# Patient Record
Sex: MALE | Race: AMERICAN INDIAN OR ALASKA NATIVE
[De-identification: names, ages, dates, MRNs, and addresses within clinical notes are randomized per-mention and may not be internally consistent; named-entity substitution may affect disease eponyms.]

---

## 2017-10-26 ENCOUNTER — HOSPITAL ENCOUNTER (EMERGENCY)
Dept: HOSPITAL 42 - ED | Age: 27
Discharge: HOME | End: 2017-10-26
Payer: SELF-PAY

## 2017-10-26 VITALS — BODY MASS INDEX: 26.2 KG/M2

## 2017-10-26 VITALS
RESPIRATION RATE: 18 BRPM | OXYGEN SATURATION: 99 % | HEART RATE: 64 BPM | TEMPERATURE: 98.7 F | SYSTOLIC BLOOD PRESSURE: 143 MMHG | DIASTOLIC BLOOD PRESSURE: 77 MMHG

## 2017-10-26 DIAGNOSIS — J03.90: Primary | ICD-10-CM

## 2017-10-26 PROCEDURE — 96372 THER/PROPH/DIAG INJ SC/IM: CPT

## 2017-10-26 PROCEDURE — 99282 EMERGENCY DEPT VISIT SF MDM: CPT

## 2017-10-26 NOTE — ED PDOC
Arrival/HPI





- General


Chief Complaint: ENT Problem


Time Seen by Provider: 10/26/17 00:37


Historian: Patient





- History of Present Illness


Narrative History of Present Illness (Text): 





10/26/17 00:58


26yo male present with sore throat, swollen glands, subjective fever, headache 

x days. He was given Zithromycin for strep throat this morning. Came to 

emergency department for the persistent sore throat. +Odynophagia. Deneis nausea

, vomiting, abdominal pain, dysphagoia, sick contact, any other complaint. 











Past Medical History





- Provider Review


Nursing Documentation Reviewed: Yes





- Psychiatric


Hx Depression: No


Hx Emotional Abuse: No


Hx Physical Abuse: No


Hx Substance Use: No





- Suicidal Assessment


Feels Threatened In Home Enviroment: No





Family/Social History





- Physician Review


Nursing Documentation Reviewed: Yes


Family/Social History: Unknown Family HX


Smoking Status: Never Smoked


Hx Alcohol Use: Yes


Frequency of alcohol use: Socially


Hx Substance Use: No


Hx Substance Use Treatment: No





Allergies/Home Meds


Allergies/Adverse Reactions: 


Allergies





No Known Allergies Allergy (Verified 10/26/17 00:27)


 








Home Medications: 


 Home Meds











 Medication  Instructions  Recorded  Confirmed


 


Unobtainable  10/26/17 10/26/17














Review of Systems





- Physician Review


All systems were reviewed & negative as marked: Yes





- Review of Systems


Constitutional: Normal


Eyes: Normal


ENT: Sore Throat


Respiratory: Normal


Cardiovascular: Normal


Gastrointestinal: Normal


Genitourinary Male: Normal


Musculoskeletal: Normal


Skin: Normal


Neurological: Normal


Endocrine: Normal


Hemo/Lymphatic: Normal


Psychiatric: Normal





Physical Exam


Vital Signs Reviewed: Yes


Vital Signs











  Temp Pulse Resp BP Pulse Ox


 


 10/26/17 00:28  98.7 F  64  18  143/77  99











Temperature: Afebrile


Blood Pressure: Normal


Pulse: Regular


Respiratory Rate: Normal


Appearance: Positive for: Well-Appearing, Non-Toxic, Comfortable


Pain Distress: None


Mental Status: Positive for: Alert and Oriented X 3





- Systems Exam


Head: Present: Atraumatic, Normocephalic


Pupils: Present: PERRL


Extroacular Muscles: Present: EOMI


Conjunctiva: Present: Normal


Mouth: Present: Moist Mucous Membranes


Pharnyx: Present: EXUDATE, TONSILS ENLARGED, Peritonsilar Swelling.  No: 

ERYTHEMA, Uvular Deviation, Muffled/Hoarse Voice, Strider, Soft Palate/Uvular 

Edema


Neck: Present: Normal Range of Motion


Respiratory/Chest: Present: Clear to Auscultation, Good Air Exchange.  No: 

Respiratory Distress, Accessory Muscle Use


Cardiovascular: Present: Regular Rate and Rhythm, Normal S1, S2.  No: Murmurs


Abdomen: Present: Normal Bowel Sounds.  No: Tenderness, Distention, Peritoneal 

Signs


Back: Present: Normal Inspection


Upper Extremity: Present: Normal Inspection.  No: Cyanosis, Edema


Lower Extremity: Present: Normal Inspection.  No: Edema


Neurological: Present: GCS=15, CN II-XII Intact, Speech Normal


Skin: Present: Warm, Dry, Normal Color.  No: Rashes


Psychiatric: Present: Alert, Oriented x 3, Normal Insight, Normal Concentration





Medical Decision Making


ED Course and Treatment: 





10/26/17 01:20


26yo male with sore throat.





He was noted to be controlling his secretion in emergency department. He was 

afebrile and hemodyanmically stable. No uvula deviation was noted. Exudate and 

peritonsillar swelling was noted.





He is already on Zithro.





He will be tx in emergency department with Paz FELICIANO.





DC home and advised to continue with his medication as was directed. Referred 

to his PMD. TRT emergency department or any new or worsening symptoms.











- Medication Orders


Current Medication Orders: 











Discontinued Medications





Dexamethasone (Decadron Inj)  10 mg IM STAT STA


   Stop: 10/26/17 01:02


Ibuprofen (Motrin Tab)  400 mg PO STAT STA


   Stop: 10/26/17 01:03


Penicillin G Benzathine (Bicillin L-A Inj)  1,200,000 units IM STAT STA


   PRN Reason: Protocol


   Stop: 10/26/17 01:02











Disposition/Present on Arrival





- Present on Arrival


Any Indicators Present on Arrival: No


History of DVT/PE: No


History of Uncontrolled Diabetes: No


Urinary Catheter: No


History of Decub. Ulcer: No


History Surgical Site Infection Following: None





- Disposition


Have Diagnosis and Disposition been Completed?: Yes


Diagnosis: 


 Acute tonsillitis





Disposition: HOME/ ROUTINE


Disposition Time: 01:25


Patient Plan: Discharge


Condition: STABLE


Discharge Instructions (ExitCare):  Tonsillitis (ED)


Additional Instructions: 


Follow up with your doctor


Continue with your medication


Return to Ed for any new or worsening symptoms


Referrals: 


[facility] [place] - Follow up with primary


Forms:  [facility] [employer] [facility] (English)

## 2017-12-12 ENCOUNTER — HOSPITAL ENCOUNTER (EMERGENCY)
Dept: HOSPITAL 42 - ED | Age: 27
LOS: 1 days | Discharge: HOME | End: 2017-12-13
Payer: COMMERCIAL

## 2017-12-12 VITALS — BODY MASS INDEX: 29.2 KG/M2

## 2017-12-12 DIAGNOSIS — J03.90: Primary | ICD-10-CM

## 2017-12-12 PROCEDURE — 99283 EMERGENCY DEPT VISIT LOW MDM: CPT

## 2017-12-12 PROCEDURE — 96372 THER/PROPH/DIAG INJ SC/IM: CPT

## 2017-12-13 VITALS
RESPIRATION RATE: 18 BRPM | DIASTOLIC BLOOD PRESSURE: 68 MMHG | HEART RATE: 70 BPM | SYSTOLIC BLOOD PRESSURE: 115 MMHG | TEMPERATURE: 98.8 F | OXYGEN SATURATION: 96 %

## 2017-12-13 NOTE — ED PDOC
Arrival/HPI





- General


Chief Complaint: ENT Problem


Time Seen by Provider: 12/12/17 23:59


Historian: Patient





- History of Present Illness


Narrative History of Present Illness (Text): 


12/13/17 00:20


A 27 year old male presents to the emergency department complaining of sore 

throat discomfort for the past couple days. Patient also reports a low grade 

fever earlier. Denies any difficulty swallowing. Patient denies any nausea, 

vomiting, diarrhea, abdominal pain, shortness of breath or any other complaints 

at this time.





Time/Duration: < week


Symptom Onset: Sudden


Symptom Course: Unchanged


Activities at Onset: Rest


Context: Home





Past Medical History





- Provider Review


Nursing Documentation Reviewed: Yes





- Cardiac


Hx Cardiac Disorders: No





- Pulmonary


Hx Respiratory Disorders: No





- Neurological


Hx Neurological Disorder: No





- HEENT


Hx HEENT Disorder: No





- Renal


Hx Renal Disorder: No





- Endocrine/Metabolic


Hx Endocrine Disorders: No





- Hematological/Oncological


Hx Blood Disorders: No





- Integumentary


Hx Dermatological Disorder: No





- Musculoskeletal/Rheumatological


Hx Musculoskeletal Disorders: No





- Gastrointestinal


Hx Gastrointestinal Disorders: No





- Genitourinary/Gynecological


Hx Genitourinary Disorders: No





- Psychiatric


Hx Depression: No


Hx Emotional Abuse: No


Hx Physical Abuse: No


Hx Substance Use: No





- Anesthesia


Hx Anesthesia: No


Hx Anesthesia Reactions: No


Hx Malignant Hyperthermia: No





- Suicidal Assessment


Feels Threatened In Home Enviroment: No





Family/Social History





- Physician Review


Nursing Documentation Reviewed: Yes


Family/Social History: No Known Family HX


Smoking Status: Never Smoked


Hx Alcohol Use: Yes


Hx Substance Use: No


Hx Substance Use Treatment: No





Allergies/Home Meds


Allergies/Adverse Reactions: 


Allergies





No Known Allergies Allergy (Verified 12/13/17 00:01)


 











Review of Systems





- Physician Review


All systems were reviewed & negative as marked: Yes





- Review of Systems


Constitutional: Fevers (low grade)


ENT: Sore Throat


Respiratory: absent: SOB


Gastrointestinal: absent: Abdominal Pain, Diarrhea, Nausea, Vomiting





Physical Exam


Vital Signs Reviewed: Yes


Vital Signs











  Temp Pulse Resp BP Pulse Ox


 


 12/13/17 00:02  98.8 F  70  18  115/68  96


 


 12/13/17 00:01  98.8 F  70  18  115/68  96











Temperature: Afebrile


Blood Pressure: Normal


Pulse: Regular


Respiratory Rate: Normal


Appearance: Positive for: Well-Appearing, Non-Toxic, Comfortable


Pain Distress: None


Mental Status: Positive for: Alert and Oriented X 3





- Systems Exam


Head: Present: Atraumatic, Normocephalic


Pupils: Present: PERRL


Extroacular Muscles: Present: EOMI


Conjunctiva: Present: Normal


Mouth: Present: Moist Mucous Membranes


Pharnyx: Present: ERYTHEMA (tonsils b/l), EXUDATE (b/l tonsil)


Neck: Present: Normal Range of Motion, Other (supple; mild anterior cervical 

adenopathy).  No: Meningeal Signs


Respiratory/Chest: Present: Clear to Auscultation, Good Air Exchange.  No: 

Respiratory Distress, Accessory Muscle Use


Cardiovascular: Present: Regular Rate and Rhythm, Normal S1, S2.  No: Murmurs


Abdomen: Present: Normal Bowel Sounds.  No: Tenderness, Distention, Peritoneal 

Signs


Back: Present: Normal Inspection


Upper Extremity: Present: Normal Inspection.  No: Cyanosis, Edema


Lower Extremity: Present: Normal Inspection.  No: Edema


Neurological: Present: GCS=15, CN II-XII Intact, Speech Normal


Skin: Present: Warm, Dry, Normal Color.  No: Rashes


Psychiatric: Present: Alert, Oriented x 3, Normal Insight, Normal Concentration





Medical Decision Making


ED Course and Treatment: 


12/13/17 00:40


Impression:


A 27 year old male with sore throat discomfort and low grade fever.





Plan:


-- Bicillin, Decadron


-- Reassess and disposition





Prior Visits:


Notes and results from previous visits were reviewed. Patient was last seen in 

the emergency department on 10/26/17 for evaluation of sore throat, swollen 

glands, subjective fever and headache.





Progress Notes:





12/13/17 00:45


On re-evaluation, patient feels better and is in no acute distress. I have 

discussed the results and plan with the patient, who expresses understanding. 

Patient in agreement with plan to be discharged home. Patient is stable for 

discharge. Patient was instructed to follow up with physician or return if 

symptoms worsen or new concerning symptoms arise.








- Medication Orders


Current Medication Orders: 











Discontinued Medications





Dexamethasone (Decadron Inj)  10 mg IM ONCE ONE


   Stop: 12/13/17 00:33


   Last Admin: 12/13/17 00:52  Dose: 10 mg





IM Administration Charges


 Document     12/13/17 00:52  ADÁN  (Rec: 12/13/17 00:52  AD[place]  [facility] [id number]


     Injection Site


      MAR Injection Site                         Right Gluteus Ar


     Charges for Administration


      # of IM Administrations                    1





Penicillin G Benzathine (Bicillin L-A Inj)  1,200,000 units IM STAT STA


   PRN Reason: Protocol


   Stop: 12/13/17 00:32


   Last Admin: 12/13/17 00:51  Dose: 1,200,000 units





IM Administration Charges


 Document     12/13/17 00:51  ADÁN  (Rec: 12/13/17 00:52  AD[place]  [facility] [id number]


     Injection Site


      MAR Injection Site                         Left Gluteus Ar


     Charges for Administration


      # of IM Administrations                    1














- Scribe Statement


The provider has reviewed the documentation as recorded by the Scribe


Jordi Murray





Provider Scribe Attestation:


All medical record entries made by the Scribe were at my direction and 

personally dictated by me. I have reviewed the chart and agree that the record 

accurately reflects my personal performance of the history, physical exam, 

medical decision making, and the department course for this patient. I have 

also personally directed, reviewed, and agree with the discharge instructions 

and disposition.











Disposition/Present on Arrival





- Present on Arrival


Any Indicators Present on Arrival: No


History of DVT/PE: No


History of Uncontrolled Diabetes: No


Urinary Catheter: No


History of Decub. Ulcer: No


History Surgical Site Infection Following: None





- Disposition


Have Diagnosis and Disposition been Completed?: Yes


Diagnosis: 


 Tonsillitis





Disposition: HOME/ ROUTINE


Disposition Time: 00:50


Patient Plan: Discharge


Condition: STABLE


Discharge Instructions (ExitCare):  Tonsillitis (ED)


Additional Instructions: 


Drink cool liquids/take meds as prescribed/follow up with your doctor this week


Prescriptions: 


Amoxicillin [Amoxil 500 mg Cap] 500 mg PO TID #21 cap


Forms:  [employer] (English)

## 2018-08-11 ENCOUNTER — HOSPITAL ENCOUNTER (EMERGENCY)
Dept: HOSPITAL 31 - C.ER | Age: 28
LOS: 1 days | Discharge: HOME | End: 2018-08-12
Payer: COMMERCIAL

## 2018-08-11 VITALS — BODY MASS INDEX: 29.2 KG/M2

## 2018-08-11 VITALS — TEMPERATURE: 98.6 F

## 2018-08-11 DIAGNOSIS — K29.70: ICD-10-CM

## 2018-08-11 DIAGNOSIS — R10.31: Primary | ICD-10-CM

## 2018-08-11 LAB
APTT BLD: 32 SECONDS (ref 21–34)
BASOPHILS # BLD AUTO: 0.1 K/UL (ref 0–0.2)
BASOPHILS NFR BLD: 0.6 % (ref 0–2)
EOSINOPHIL # BLD AUTO: 0 K/UL (ref 0–0.7)
EOSINOPHIL NFR BLD: 0.1 % (ref 0–4)
ERYTHROCYTE [DISTWIDTH] IN BLOOD BY AUTOMATED COUNT: 13.9 % (ref 11.5–14.5)
HGB BLD-MCNC: 13.7 G/DL (ref 12–18)
INR PPP: 1.2
LYMPHOCYTES # BLD AUTO: 1.9 K/UL (ref 1–4.3)
LYMPHOCYTES NFR BLD AUTO: 13.3 % (ref 20–40)
MCH RBC QN AUTO: 27.8 PG (ref 27–31)
MCHC RBC AUTO-ENTMCNC: 33.2 G/DL (ref 33–37)
MCV RBC AUTO: 83.5 FL (ref 80–94)
MONOCYTES # BLD: 1.2 K/UL (ref 0–0.8)
MONOCYTES NFR BLD: 8.4 % (ref 0–10)
NEUTROPHILS # BLD: 11.2 K/UL (ref 1.8–7)
NEUTROPHILS NFR BLD AUTO: 77.6 % (ref 50–75)
NRBC BLD AUTO-RTO: 0 % (ref 0–2)
PLATELET # BLD: 243 K/UL (ref 130–400)
PMV BLD AUTO: 8.1 FL (ref 7.2–11.7)
PROTHROMBIN TIME: 13 SECONDS (ref 9.7–12.2)
RBC # BLD AUTO: 4.95 MIL/UL (ref 4.4–5.9)
WBC # BLD AUTO: 14.5 K/UL (ref 4.8–10.8)

## 2018-08-11 PROCEDURE — 83690 ASSAY OF LIPASE: CPT

## 2018-08-11 PROCEDURE — 85025 COMPLETE CBC W/AUTO DIFF WBC: CPT

## 2018-08-11 PROCEDURE — 80053 COMPREHEN METABOLIC PANEL: CPT

## 2018-08-11 PROCEDURE — 96361 HYDRATE IV INFUSION ADD-ON: CPT

## 2018-08-11 PROCEDURE — 85610 PROTHROMBIN TIME: CPT

## 2018-08-11 PROCEDURE — 99284 EMERGENCY DEPT VISIT MOD MDM: CPT

## 2018-08-11 PROCEDURE — 81001 URINALYSIS AUTO W/SCOPE: CPT

## 2018-08-11 PROCEDURE — 96374 THER/PROPH/DIAG INJ IV PUSH: CPT

## 2018-08-11 PROCEDURE — 74177 CT ABD & PELVIS W/CONTRAST: CPT

## 2018-08-11 PROCEDURE — 85730 THROMBOPLASTIN TIME PARTIAL: CPT

## 2018-08-11 NOTE — C.PDOC
History Of Present Illness


28 year old male presents to the ED c/o diffuse crampy abdominal pain for the 

past 3 months, that he rates at a 4/10. Patient now states that for the past 2-

3 weeks he has been having RLQ abdominal pain. Patient states no change in 

bowel habits. Patient denies fever, chills, nausea, vomit, diarrhea, recent 

travel, sick contacts. 


Time Seen by Provider: 08/11/18 23:39


Chief Complaint (Nursing): Abdominal Pain


History Per: Patient


History/Exam Limitations: no limitations


Onset/Duration Of Symptoms: Days


Current Symptoms Are (Timing): Still Present


Severity: Mild


Location Of Pain/Discomfort: Diffuse, RLQ


Radiation Of Pain To:: None


Quality Of Discomfort: Cramping


Associated Symptoms: denies: Nausea, Vomiting, Diarrhea


Alleviating Factors: None


Recent travel outside of the [place]: No


Additional History Per: Patient





Past Medical History


Reviewed: Historical Data, Nursing Documentation, Vital Signs


Vital Signs: 


 Last Vital Signs











Temp  98.6 F   08/11/18 23:28


 


Pulse  70   08/11/18 23:42


 


Resp  20   08/11/18 23:28


 


BP  117/78   08/11/18 23:28


 


Pulse Ox  98   08/11/18 23:49














- Medical History


PMH: No Chronic Diseases


   Denies: Depression, Chronic Kidney Disease


Surgical History: No Surg Hx





- [facility] Procedures








APPLICATION OF SPLINT (06/05/12)








Family History: States: Unknown Family Hx





- Social History


Hx Tobacco Use: No


Hx Alcohol Use: Yes


Hx Substance Use: No





- Immunization History


Hx Tetanus Toxoid Vaccination: No


Hx Influenza Vaccination: No


Hx Pneumococcal Vaccination: No





Review Of Systems


Constitutional: Negative for: Fever, Chills


Cardiovascular: Negative for: Chest Pain


Respiratory: Negative for: Shortness of Breath


Gastrointestinal: Positive for: Abdominal Pain.  Negative for: Nausea, Vomiting

, Diarrhea, Constipation


Musculoskeletal: Negative for: Back Pain


Skin: Negative for: Rash





Physical Exam





- Physical Exam


Appears: Non-toxic, No Acute Distress


Skin: Warm, Dry


Head: Normacephalic


Eye(s): bilateral: Normal Inspection


Oral Mucosa: Moist


Neck: Supple


Chest: Symmetrical


Cardiovascular: Rhythm Regular


Respiratory: No Rales, No Rhonchi, No Wheezing


Gastrointestinal/Abdominal: Soft, Tenderness (mild, diffuse ), No Guarding, No 

Rebound, Other (tympanic to percussion)


Back: Normal Inspection


Extremity: No Tenderness, No Swelling


Extremity: Bilateral: Atraumatic, Normal Color And Temperature, Normal ROM


Neurological/Psych: Oriented x3, Normal Speech


Gait: Steady





ED Course And Treatment





- Laboratory Results


Result Diagrams: 


 08/11/18 23:46





 08/11/18 23:46


O2 Sat by Pulse Oximetry: 98 (ON RA)


Pulse Ox Interpretation: Normal


Progress Note: Plan:  - CT abd/pelvis.  - Labs.  - Protonix 40 mg IVP.  - IV 

fluids.  - UA


Reevaluation Time: 03:14


Reassessment Condition: Improved





Medical Decision Making


Medical Decision Making: 





Upon provider reevaluation patient is feeling better, is medically stable, and 

requires no further treatment in the ED at this time. Patient will be 

discharged home with Rx for flagyl, protonix . Counseling was provided and all 

questions were answered regarding diagnosis and need for follow up with the 

referred clinic. There is agreement to discharge plan. Return if symptoms 

persist or worsen.





Disposition


Counseled Patient/Family Regarding: Studies Performed, Diagnosis, Need For 

Followup, Rx Given





- Disposition


Referrals: 


[facility] [Outside]


[facility] [[facility]]


Disposition: HOME/ ROUTINE


Disposition Time: 23:39


Condition: FAIR


Additional Instructions: 


Please return if symptoms recur





Prescriptions: 


metroNIDAZOLE [Flagyl] 500 mg PO TID #21 tab


Pantoprazole Sodium [Protonix] 40 mg PO DAILY #15 ect


Instructions:  Acute Abdomen (Belly Pain), Adult (DC), Gastritis (DC)


Forms:  [facility] (English)





- Clinical Impression


Clinical Impression: 


 Abdominal pain, Gastritis








- Scribe Statement


The provider has reviewed the documentation as recorded by the Scribpierre Walker





All medical record entries made by the Scribe were at my direction and 

personally dictated by me. I have reviewed the chart and agree that the record 

accurately reflects my personal performance of the history, physical exam, 

medical decision making, and the department course for this patient. I have 

also personally directed, reviewed, and agree with the discharge instructions 

and disposition.

## 2018-08-12 VITALS
DIASTOLIC BLOOD PRESSURE: 70 MMHG | OXYGEN SATURATION: 98 % | RESPIRATION RATE: 14 BRPM | SYSTOLIC BLOOD PRESSURE: 119 MMHG | HEART RATE: 78 BPM

## 2018-08-12 LAB
ALBUMIN SERPL-MCNC: 4.9 G/DL (ref 3.5–5)
ALBUMIN/GLOB SERPL: 1.4 {RATIO} (ref 1–2.1)
ALT SERPL-CCNC: 26 U/L (ref 21–72)
AST SERPL-CCNC: 39 U/L (ref 17–59)
BILIRUB UR-MCNC: NEGATIVE MG/DL
BUN SERPL-MCNC: 17 MG/DL (ref 9–20)
CALCIUM SERPL-MCNC: 10.2 MG/DL (ref 8.6–10.4)
GFR NON-AFRICAN AMERICAN: 52
GLUCOSE UR STRIP-MCNC: NORMAL MG/DL
LEUKOCYTE ESTERASE UR-ACNC: (no result) LEU/UL
LIPASE: 192 U/L (ref 23–300)
PH UR STRIP: 5 [PH] (ref 5–8)
PROT UR STRIP-MCNC: (no result) MG/DL
RBC # UR STRIP: NEGATIVE /UL
SP GR UR STRIP: > 1.06 (ref 1–1.03)
SQUAMOUS EPITHIAL: 1 /HPF (ref 0–5)
UROBILINOGEN UR-MCNC: 2 MG/DL (ref 0.2–1)

## 2018-08-12 NOTE — CT
Date of service: 



08/12/2018



PROCEDURE:  CT Abdomen and Pelvis with contrast



HISTORY:

rlq abd



COMPARISON:

Comparison is made with 02/04/2016



TECHNIQUE:

Contrast dose: 100 mL Visipaque 320.



Axial and reformatted coronal and sagittal CT images of the abdomen 

and pelvis were obtained after IV contrast administration.



Radiation dose:



Total exam DLP = 995.53 mGy-cm.



This CT exam was performed using one or more of the following dose 

reduction techniques: Automated exposure control, adjustment of the 

mA and/or kV according to patient size, and/or use of iterative 

reconstruction technique.



FINDINGS:



LOWER THORAX:

Unremarkable. 



LIVER:

Mild to moderate hepatomegaly is again noted.  Mild heterogeneous 

coarse enhancement of the liver is noted. No evidence of discrete 

mass lesion. The portal vein is patent. 



GALLBLADDER AND BILE DUCTS:

Unremarkable. 



PANCREAS:

Unremarkable. No gross lesion or ductal dilatation.



SPLEEN:

Unremarkable. 



ADRENALS:

Unremarkable. No mass. 



KIDNEYS AND URETERS:

Unremarkable. No hydronephrosis. No solid mass. 



VASCULATURE:

Unremarkable. No aortic aneurysm. 



BOWEL:

Unremarkable. No obstruction. No gross mural thickening. 



APPENDIX:

Normal appendix. 



PERITONEUM:

Unremarkable. No free fluid. No free air. 



LYMPH NODES:

Unremarkable. No enlarged lymph nodes. 



BLADDER:

Unremarkable. 



REPRODUCTIVE:

Unremarkable. 



BONES:

No acute fracture. 



OTHER FINDINGS:

None.



IMPRESSION:

No evidence of appendicitis. No evidence of cholecystitis or 

pancreatitis.



No evidence of hydronephrosis or bowel obstruction.

## 2019-03-31 ENCOUNTER — HOSPITAL ENCOUNTER (INPATIENT)
Dept: HOSPITAL 42 - ED | Age: 29
LOS: 3 days | Discharge: HOME | DRG: 310 | End: 2019-04-03
Attending: INTERNAL MEDICINE | Admitting: INTERNAL MEDICINE
Payer: COMMERCIAL

## 2019-03-31 VITALS — BODY MASS INDEX: 32.5 KG/M2

## 2019-03-31 DIAGNOSIS — E66.9: ICD-10-CM

## 2019-03-31 DIAGNOSIS — R07.9: ICD-10-CM

## 2019-03-31 DIAGNOSIS — D72.829: ICD-10-CM

## 2019-03-31 DIAGNOSIS — I07.1: ICD-10-CM

## 2019-03-31 DIAGNOSIS — I48.0: Primary | ICD-10-CM

## 2019-03-31 DIAGNOSIS — R06.02: ICD-10-CM

## 2019-03-31 DIAGNOSIS — R00.2: ICD-10-CM

## 2019-03-31 LAB
ALBUMIN SERPL-MCNC: 4.4 G/DL (ref 3–4.8)
ALBUMIN/GLOB SERPL: 1 {RATIO} (ref 1.1–1.8)
ALT SERPL-CCNC: 25 U/L (ref 7–56)
APPEARANCE UR: CLEAR
APTT BLD: 35.7 SECONDS (ref 26.9–38.3)
AST SERPL-CCNC: 40 U/L (ref 17–59)
BASOPHILS # BLD AUTO: 0.03 K/MM3 (ref 0–2)
BASOPHILS NFR BLD: 0.3 % (ref 0–3)
BILIRUB UR-MCNC: NEGATIVE MG/DL
BNP SERPL-MCNC: 359 PG/ML (ref 0–450)
BUN SERPL-MCNC: 13 MG/DL (ref 7–21)
CALCIUM SERPL-MCNC: 10.2 MG/DL (ref 8.4–10.5)
CK MB SERPL-MCNC: 1.5 NG/ML (ref 0–3.6)
COLOR UR: YELLOW
D DIMER PPP FEU-MCNC: 531 NG/MLDDU (ref 0–243)
EOSINOPHIL # BLD: 0.3 10*3/UL (ref 0–0.7)
EOSINOPHIL NFR BLD: 2.8 % (ref 1.5–5)
ERYTHROCYTE [DISTWIDTH] IN BLOOD BY AUTOMATED COUNT: 13.7 % (ref 11.5–14.5)
GFR NON-AFRICAN AMERICAN: > 60
GLUCOSE UR STRIP-MCNC: NEGATIVE MG/DL
HGB BLD-MCNC: 15.6 G/DL (ref 14–18)
INR PPP: 1.02
LEUKOCYTE ESTERASE UR-ACNC: NEGATIVE LEU/UL
LYMPHOCYTES # BLD: 4.3 10*3/UL (ref 1.2–3.4)
LYMPHOCYTES NFR BLD AUTO: 36.4 % (ref 22–35)
MCH RBC QN AUTO: 28.2 PG (ref 25–35)
MCHC RBC AUTO-ENTMCNC: 32.7 G/DL (ref 31–37)
MCV RBC AUTO: 86.1 FL (ref 80–105)
MONOCYTES # BLD AUTO: 0.7 10*3/UL (ref 0.1–0.6)
MONOCYTES NFR BLD: 5.9 % (ref 1–6)
PH UR STRIP: 7 [PH] (ref 4.7–8)
PLATELET # BLD: 275 10^3/UL (ref 120–450)
PMV BLD AUTO: 10.2 FL (ref 7–11)
PROT UR STRIP-MCNC: NEGATIVE MG/DL
PROTHROMBIN TIME: 11.3 SECONDS (ref 9.4–12.5)
RBC # BLD AUTO: 5.54 10^6/UL (ref 3.5–6.1)
RBC # UR STRIP: NEGATIVE /UL
SP GR UR STRIP: 1.01 (ref 1–1.03)
T3 SERPL-MCNC: 1.34 NG/ML (ref 0.97–1.69)
TROPONIN I SERPL-MCNC: < 0.01 NG/ML
UROBILINOGEN UR STRIP-ACNC: 0.2 E.U./DL
WBC # BLD AUTO: 11.8 10^3/UL (ref 4.5–11)

## 2019-03-31 NOTE — RAD
Date of service: 



03/31/2019



HISTORY:

 CP 



COMPARISON:

No prior. 



TECHNIQUE:

1 view obtained.



FINDINGS:



LUNGS:

No active pulmonary disease.



PLEURA:

No significant pleural effusion identified, no pneumothorax apparent.



CARDIOVASCULAR:

No aortic atherosclerotic calcification present.



Normal cardiac size. No pulmonary vascular congestion. 



OSSEOUS STRUCTURES:

No significant abnormalities.



VISUALIZED UPPER ABDOMEN:

Normal.



OTHER FINDINGS:

None.



IMPRESSION:

No active disease.

## 2019-03-31 NOTE — CT
Date of service: 



03/31/2019



PROCEDURE:  CT Chest with contrast (Pulmonary Angiogram)



HISTORY:

cough/chest pain/



COMPARISON:

None available.



TECHNIQUE:

Axial computed tomography images were obtained of the chest in the 

pulmonary arterial phase of enhancement. Coronal and sagittal 

reformatted images were created and reviewed.



Intravenous contrast dose: 150 cc of Omni 350



Radiation dose:



Total exam DLP = 566.09 mGy-cm.



This CT exam was performed using one or more of the following dose 

reduction techniques: Automated exposure control, adjustment of the 

mA and/or kV according to patient size, and/or use of iterative 

reconstruction technique.



FINDINGS:



PULMONARY ARTERIES:

Unremarkable. No pulmonary embolism. 



AORTA:

No acute findings. No thoracic aortic aneurysm. No aortic 

atherosclerotic calcification or mural plaque present.



LUNGS:

Unremarkable. No nodule, mass or pulmonary consolidation. 



PLEURAL SPACES:

Unremarkable. No effusion or pneumothorax. 



HEART:

Unremarkable. No cardiomegaly. No significant pericardial effusion. 



LYMPH NODES:

No lymphadenopathy.



BONES, CHEST WALL:

Unremarkable. No fracture or destructive lesion 



OTHER FINDINGS:

Unremarkable. 



IMPRESSION:

Unremarkable CT pulmonary angiogram. No pulmonary embolus.

## 2019-03-31 NOTE — ED PDOC
Arrival/HPI





- General


Chief Complaint: Chest Pain


Time Seen by Provider: 03/31/19 13:10


Historian: Patient





- History of Present Illness


Narrative History of Present Illness (Text): 





03/31/19 13:30


28 year old male, with no significant past medical history, presents to the 

emergency department complaining of cough for the past 1 week. Patient reports 

he has been having intermittent shortness of breath, chest pain, and palpitatio

ns since yesterday. He states he tried over the counter [place] and 

Theraflu without any improvement. Patient denies any fever, chills, abdominal 

pain, nausea, vomiting, diarrhea, constipation, urinary symptoms, back pain, 

neck pain, headache, dizziness, weakness, or any other complaints. 





Time/Duration: 1 week


Symptom Onset: Gradual


Symptom Course: Unchanged


Activities at Onset: Light


Context: Home





Past Medical History





- Provider Review


Nursing Documentation Reviewed: Yes





- Travel History


Have you recently traveled outside [place] w/in the past 3 mons?: No





- Tetanus Immunization


Tetanus Immunization: Unknown





- Cardiac


Hx Cardiac Disorders: No





- Pulmonary


Hx Respiratory Disorders: No





- Neurological


Hx Neurological Disorder: No





- HEENT


Hx HEENT Disorder: No





- Renal


Hx Renal Disorder: No





- Endocrine/Metabolic


Hx Endocrine Disorders: No





- Hematological/Oncological


Hx Blood Disorders: No





- Integumentary


Hx Dermatological Disorder: No





- Musculoskeletal/Rheumatological


Hx Musculoskeletal Disorders: No





- Gastrointestinal


Hx Gastrointestinal Disorders: No





- Genitourinary/Gynecological


Hx Genitourinary Disorders: No





- Psychiatric


Hx Depression: No


Hx Substance Use: No





- Anesthesia


Hx Anesthesia: No


Hx Anesthesia Reactions: No


Hx Malignant Hyperthermia: No





- Suicidal Assessment


Feels Threatened In Home Enviroment: No





Family/Social History





- Physician Review


Nursing Documentation Reviewed: Yes


Family/Social History: No Known Family HX


Smoking Status: Never Smoked


Hx Alcohol Use: Yes


Frequency of alcohol use: Socially


Hx Substance Use: No


Hx Substance Use Treatment: No





Allergies/Home Meds


Allergies/Adverse Reactions: 


Allergies





No Known Allergies Allergy (Verified 03/31/19 13:16)


   








Home Medications: 


                                    Home Meds











 Medication  Instructions  Recorded  Confirmed


 


No Known Home Med  03/31/19 03/31/19














Review of Systems





- Physician Review


All systems were reviewed & negative as marked: Yes





- Review of Systems


Constitutional: absent: Fatigue, Fevers, Other (chills)


ENT: absent: Sore Throat, Sinus Congestion


Respiratory: SOB, Cough


Cardiovascular: Chest Pain, Palpitations


Gastrointestinal: absent: Abdominal Pain, Constipation, Diarrhea, Nausea, 

Vomiting


Genitourinary Male: absent: Dysuria, Frequency, Hematuria


Musculoskeletal: absent: Back Pain, Neck Pain


Neurological: absent: Headache, Dizziness, Focal Weakness


Psychiatric: absent: Anxiety, Depression





Physical Exam


Vital Signs Reviewed: Yes





Vital Signs











  Temp Pulse Resp BP Pulse Ox


 


 03/31/19 14:01     115/56 L 


 


 03/31/19 14:00   149 H   


 


 03/31/19 13:06  98.2 F  82  18  127/95 H  99











Temperature: Afebrile


Blood Pressure: Normal


Pulse: Regular


Respiratory Rate: Normal


Appearance: Positive for: Well-Appearing, Non-Toxic, Comfortable


Pain Distress: None


Mental Status: Positive for: Alert and Oriented X 3





- Systems Exam


Head: Present: Atraumatic, Normocephalic


Pupils: Present: PERRL


Extroacular Muscles: Present: EOMI


Conjunctiva: Present: Normal


Mouth: Present: Moist Mucous Membranes


Neck: Present: Normal Range of Motion


Respiratory/Chest: Present: Clear to Auscultation, Good Air Exchange.  No: 

Respiratory Distress, Accessory Muscle Use


Cardiovascular: Present: Irregular Rhythm, Tachycardic.  No: Regular Rate and 

Rhythm, Murmurs, Rub, Muffled


Abdomen: No: Tenderness, Distention, Peritoneal Signs, Rebound, Guarding


Back: Present: Normal Inspection


Upper Extremity: Present: Normal ROM


Lower Extremity: Present: Normal ROM.  No: CALF TENDERNESS


Neurological: Present: GCS=15, Speech Normal


Skin: Present: Warm, Dry, Normal Color.  No: Rashes


Psychiatric: Present: Alert, Oriented x 3





Medical Decision Making


ED Course and Treatment: 





03/31/19 13:30


Impression:


28 year old male presents complaining of cough for the past 1 week associated 

with intermittent shortness of breath, chest pain, and palpitations for the past

few days. 





Plan:


-- CT Angio Chest PE protocol


-- EKG 


-- Labs


-- Chest X-ray 


-- Cardizem


-- Rapid Flu


-- Urinalysis 


-- Reassess and disposition





Prior Visits:


Notes and results from previous visits were reviewed. 





Progress Notes:





Patient immediately seen and evaluated by Dr. Beltran. Patient was found to be

in rapid a-fib at range of 140-160 BPM. 10mg of Cardizem administered IV. 

Patient's heart rate now is in the 80's. 





03/31/19 16:59


cbc; wnl


cmp; wnl


trop; wnl





mag; 2.3





bnp; 359


DIMer; elevated at 531








cxr; no infiltrate








ct angio:FINDINGS:


PULMONARY ARTERIES:


Unremarkable. No pulmonary embolism. 


AORTA:


No acute findings. No thoracic aortic aneurysm. No aortic atherosclerotic 

calcification or mural plaque present.


LUNGS:


Unremarkable. No nodule, mass or pulmonary consolidation. 


PLEURAL SPACES:


Unremarkable. No effusion or pneumothorax. 


HEART:


Unremarkable. No cardiomegaly. No significant pericardial effusion. 


LYMPH NODES:


No lymphadenopathy.


BONES, CHEST WALL:


Unremarkable. No fracture or destructive lesion 


OTHER FINDINGS:


Unremarkable. 


IMPRESSION:


Unremarkable CT pulmonary angiogram. No pulmonary embolus.








pt reassessment; resting comfortably in er; HR in the 90s. no distress. 








all results discussed with patient and family in depth; 











case discussed with dr. aragon. accepts admission with cardiologist consult. Dr. aragon wants ICU consult.  





pt reassessment; pt continues to have paroxysmal atrial fibrillation. 





case discussed with dr. Montes (intensivist): pt seen and evaluated at 

bedside; pt accepted to [facility]. pt will go to ICU on Cardizem drip








Case discussed with Dr. Mitchell in depth: He advised lovenox sq. 





first dose of lovenox ordered.





All aspects of this case were discussed the attending of record.





impression; rapid afib, new onset


admit ICU








Reassessment Condition: Re-examined, Improving,but remains with symptoms





- Lab Interpretations


Lab Results: 





                                        











PT  11.3 SECONDS (9.4-12.5)   03/31/19  13:45    


 


INR  1.02   03/31/19  13:45    


 


APTT  35.7 Seconds (26.9-38.3)   03/31/19  13:45    








                                        











D-Dimer, Quantitative  531 ng/mlDDU (0-243)  H  03/31/19  13:45    








                                        











Troponin I  < 0.01 ng/mL  03/31/19  13:45    








                                        











NT-Pro-B Natriuret Pep  359 pg/mL (0-450)   03/31/19  13:45    








                                        











Total Bilirubin  0.5 mg/dL (0.2-1.3)   03/31/19  13:45    


 


AST  40 U/L (17-59)   03/31/19  13:45    


 


ALT  25 U/L (7-56)   03/31/19  13:45    


 


Alkaline Phosphatase  82 U/L ()   03/31/19  13:45    


 


Total Protein  8.8 g/dL (5.8-8.3)  H  03/31/19  13:45    


 


Albumin  4.4 g/dL (3.0-4.8)   03/31/19  13:45    


 


Globulin  4.3 gm/dL  03/31/19  13:45    


 


Albumin/Globulin Ratio  1.0  (1.1-1.8)  L  03/31/19  13:45    








                                        











Urine Color  Yellow  (YELLOW)   03/31/19  14:50    


 


Urine Appearance  Clear  (CLEAR)   03/31/19  14:50    


 


Urine pH  7.0  (4.7-8.0)   03/31/19  14:50    


 


Ur Specific Gravity  1.015  (1.005-1.035)   03/31/19  14:50    


 


Urine Protein  Negative mg/dL (<30 mg/dL)   03/31/19  14:50    


 


Urine Glucose (UA)  Negative mg/dL (NEGATIVE)   03/31/19  14:50    


 


Urine Ketones  Negative mg/dL (NEGATIVE)   03/31/19  14:50    


 


Urine Blood  Negative  (NEGATIVE)   03/31/19  14:50    


 


Urine Nitrate  Negative  (NEGATIVE)   03/31/19  14:50    


 


Urine Bilirubin  Negative  (NEGATIVE)   03/31/19  14:50    


 


Urine Urobilinogen  0.2 E.U./dL (<1 E.U./dL)   03/31/19  14:50    


 


Ur Leukocyte Esterase  Negative Arya/uL (NEGATIVE)   03/31/19  14:50    











I have reviewed the lab results: Yes





- RAD Interpretation


Radiology Orders: 











03/31/19 13:37


CHEST PORTABLE [RAD] Stat 





03/31/19 14:42


ANGIO CHEST PE PROTOCOL [CT] Stat 











: Radiologist





- EKG Interpretation


Interpreted by ED Physician: Yes


Type: 12 lead EKG





- Medication Orders


Current Medication Orders: 














Discontinued Medications





Diltiazem HCl (Cardizem)  10 mg IVP STAT STA


   Stop: 03/31/19 13:40


   Last Admin: 03/31/19 14:00  Dose: 10 mg





IVP Administration


 Document     03/31/19 14:00  CD  (Rec: 03/31/19 14:01  CD  [facility]-21)


     Charges for Administration


      # of IVP Administrations                   1


MAR Pulse and Blood Pressure


 Document     03/31/19 14:00  CD  (Rec: 03/31/19 14:01  CD  [facility])


     Pulse


      Pulse Rate (60-90 beats/min)               149














- Scribe Statement


The provider has reviewed the documentation as recorded by the Scribe





Andi Shaw





Provider Scribe Attestation:


All medical record entries made by the Scribe were at my direction and 

personally dictated by me. I have reviewed the chart and agree that the record 

accurately reflects my personal performance of the history, physical exam, 

medical decision making, and the department course for this patient. I have also

 personally directed, reviewed, and agree with the discharge instructions and 

disposition.








Disposition/Present on Arrival





- Present on Arrival


Any Indicators Present on Arrival: No


History of DVT/PE: No


History of Uncontrolled Diabetes: No


Urinary Catheter: No


History of Decub. Ulcer: No


History Surgical Site Infection Following: None





- Disposition


Have Diagnosis and Disposition been Completed?: Yes


Diagnosis: 


 Rapid atrial fibrillation





Disposition: HOSPITALIZED


Disposition Time: 15:06


Patient Plan: Admission


Patient Problems: 


                             Current Active Problems











Problem Status Onset


 


Rapid atrial fibrillation Acute 











Condition: CRITICAL


Referrals: 


PCP,NO [Primary Care Provider] - Follow up with primary


Forms:  Alliqua (English)

## 2019-04-01 RX ADMIN — ENOXAPARIN SODIUM SCH MG: 120 INJECTION SUBCUTANEOUS at 09:00

## 2019-04-01 RX ADMIN — ENOXAPARIN SODIUM SCH MG: 120 INJECTION SUBCUTANEOUS at 21:17

## 2019-04-01 NOTE — CON
DATE:  04/01/2019



REQUESTING PHYSICIAN:  Dr. Sheth.



REASON FOR CONSULTATION:  Atrial fibrillation.



HISTORY:  This is a 28-year-old man with no significant past medical

history who presents to emergency room with palpitations and was noted be

in atrial fibrillation with rapid trigger responsive.  Cardiac evaluation

was requested.  He states for the past 3-4 months, he has been bothered by

nonproductive cough and has been treated with a variety of antibiotics.  He

has had partial improvement with this.  Over the past several days, he has

noticed a worsening cough and dyspnea.  Yesterday, he also felt that his

heart was beating rapidly and erratically.  He presents to emergency room. 

He is noted to be in atrial fibrillation with rapid ventricular response. 

He was placed on IV Cardizem.  He is seen lying in bed in the CCU.  He is

currently comfortable.  He denies any chest pain or dyspnea at rest.  He is

unaware of any prior history of rhythm disorder.  He takes no medications

at home.  He takes no over-the-counter supplements.  He does work extremely

long hours and is frequently fatigued.  One to two times per week, he does

drink alcohol to excess.



PAST MEDICAL HISTORY:  As noted.



CURRENT MEDICATIONS:  Include IV diltiazem at 2.5 mg per hour.  He was also

given 1 dose of Lovenox.



ALLERGIES:  NONE.



SOCIAL HISTORY:  As mentioned above.  He denies drug use or tobacco use. 

He is single.  He works as a teacher for the school, which deals with

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



FAMILY HISTORY:  Both parents are alive and well.  His maternal grandmother

reportedly had heart disease and bypass surgery in her late years.



REVIEW OF SYSTEMS:  A 10-point review of systems is otherwise unremarkable.



PHYSICAL EXAMINATION:  GENERAL:  He is a young, healthy-appearing man.  He

appears somewhat anxious.  VITAL SIGNS:  His blood pressure is 115/70 with

a pulse of 86 in atrial fibrillation, respirations are 14, he is afebrile. 

HEENT:  Normocephalic, atraumatic.  NECK:  Supple.  No JVD noted.  CHEST: 

Clear to auscultation and percussion.  HEART:  PMI in normal position. 

Rhythm is irregular regular.  No pathologic murmurs or gallops noted. 

ABDOMEN:  Soft, nontender with bowel sounds.  EXTREMITIES:  No clubbing,

cyanosis, or edema.  SKIN:  Warm and dry.  PSYCHIATRIC:  Normal mood and

affect.  NEUROLOGICAL:  Alert and oriented x3.  No gross motor or sensory

deficits notable.



DIAGNOSTIC DATA:  White count is 11.5, hemoglobin and hematocrit 15.6 and

47.7, platelet count 275,000.  PT/PTT 11.3 and 35.7.  Potassium 4.4.  BUN

and creatinine 13 and 1.1.   with a negative MB fraction.  Troponin

is negative Thyroid panel was normal.  Toxicology screen was negative. 

Chest x-ray reveals normal cardiac silhouette with clear lung fields. 

Electrocardiogram reveals atrial fibrillation with rapid ventricular

response and secondary ST-T changes.



IMPRESSION:  Apparent recent onset atrial fibrillation, possibly due to

periodic excessive alcohol intake versus known atrial fibrillation, no

clear evidence of other underlying cause at the present time.



RECOMMENDATIONS:  IV diltiazem will be discontinued.  Oral Cardizem will be

initiated for heart rate control.  Lovenox will be initiated as well in the

event cardioversion is planned.  If he did not convert to sinus rhythm

spontaneously, a transesophageal cardiogram and electrocardioversion can be

performed.  An echocardiogram has been ordered and will be reviewed. 

Excessive alcohol abstinence was encouraged.



Thank you for this consultation.  I will be happy to follow along through

his hospital course.







__________________________________________

Mitesh Gonzalez MD



DD:  04/01/2019 8:11:16

DT:  04/01/2019 8:14:45

Job # [id number]

## 2019-04-01 NOTE — CARD
--------------- APPROVED REPORT --------------





Date of service: 03/31/2019



EKG Measurement

Heart [id number]

[id number]

[id number]

[id number]



<Conclusion>

Atrial fibrillation

Abnormal ECG

## 2019-04-01 NOTE — CARD
--------------- APPROVED REPORT --------------





Date of service: 03/31/2019



EKG Measurement

Heart [id number]

[id number]

[id number]

[id number]



<Conclusion>

Atrial fibrillation with rapid ventricular response

Minimal voltage criteria for LVH, may be normal variant

Abnormal ECG

## 2019-04-01 NOTE — PN
DATE:  04/01/2019



CRITICAL CARE PROGRESS NOTE



SUBJECTIVE:  This 28-year-old male was examined in CCU bed 5 in the

presence of his aunt and his case was reviewed with himself and nurse Rylee

registered nurse.  The patient remains in atrial fibrillation rhythm and

heart rate was in the 140s when I entered the room.  The patient's IV

Cardizem had been discontinued earlier today by intensivist Dr. James Montes and the patient has been started on oral Cardizem.  The patient

denies any fever, chills, chest pain or shortness of breath.



PHYSICAL EXAMINATION:

GENERAL:  He was lying in his bed, alert and oriented.

VITAL SIGNS:  Temperature of 98.6, respirations 16, pulse 147 and blood

pressure 131/90.  Pulse ox 98% room air.

HEENT:  Head:  Normocephalic, atraumatic.  Eyes:  No icterus.  Ears: 

Clear.  Throat:  Noninjected.

NECK:  Supple.

HEART:  Irregular S1, S2.  No pathological rubs, murmurs or gallops.

LUNGS:  Clear.

ABDOMEN:  Soft.

EXTREMITIES:  No edema.

SKIN:  Without rash.

NEUROLOGICAL:  Intact.

PSYCHOLOGICAL:  Alert.

VASCULAR:  Legs warm to touch.



IMPRESSION:  New-onset atrial fibrillation in a 28-year-old male with no

significant past medical history status post a recent upper respiratory

tract infection that persisted over the past 2 months and no history of any

shortness of breath or cardiac issues in his past.



PLAN:  The plan at present is to continue Cardizem 60 mg p.o. every 8 hours

with Lovenox 120 mg subcutaneous every 12 hours.  The patient is ordered to

have a repeat EKG and an echocardiography to evaluate wall motion.  He

continues on heart-healthy diet.  Based on the results, additional

diagnostic workup and testing will be entertained as discussed with Dr. Montes.  He may need to have cardioversion and be placed on his IV

Cardizem drip if his cardiac rhythm should become erratic.



Greater than 60 minutes was spent in the care and management, review of

labs and discussion of this patient with intensivist and Dr. Mitesh Gonzalez

from Cardiology as well as the patient and his family at bedside.











All questions were answered.







__________________________________________

Jen Sheth MD





DD:  04/01/2019 11:40:28

DT:  04/01/2019 11:43:46

[facility] # [id number]

## 2019-04-01 NOTE — CARD
--------------- APPROVED REPORT --------------





Date of service: 04/01/2019



EXAM: Two-dimensional and M-mode echocardiogram with Doppler and 

color Doppler.



INDICATION

Atrial Fibrillation



2D DIMENSIONS 

Left Atrium (2D)4.1   (1.6-4.0cm)IVSd1.2   (0.7-1.1cm)

LVDd4.7   (3.9-5.9cm)PWd1.2   (0.7-1.1cm)

LVDs3.2   (2.5-4.0cm)FS (%) 31.6   %

LVEF (%)59.6   (>50%)



M-Mode DIMENSIONS 

Aortic Root3.50   (2.2-3.7cm)Aortic Cusp Exc.2.20   (1.5-2.0cm)



Aortic Valve

AoV Peak Whghrvvw827.0cm/Litzy Peak GR.7mmHg



Mitral Valve

E/A ratio0.0



TDI

E/Lateral E'0.0E/Medial E'0.0



Pulmonary Valve

PV Peak Opxqwuva22.7cm/sPV Peak Grad.2mmHg



Tricuspid Valve

TR Peak Yfsewzus490xl/sRAP MVUCITHB83hvYwXG Peak Gr.19mmHg

[id number]



 LEFT VENTRICLE 

The left ventricle is normal size. There is mild concentric left 

ventricular hypertrophy. The left ventricular function is normal. The 

left ventricular ejection fraction is within the normal range. There 

is normal LV segmental wall motion.



 RIGHT VENTRICLE 

The right ventricle is normal size. There is normal right ventricular 

wall thickness. The right ventricular systolic function is normal.



 ATRIA 

The left atrium size is normal. The right atrium size is normal. The 

interatrial septum is intact with no evidence for an atrial septal 

defect.



 AORTIC VALVE 

The aortic valve is normal in structure. No aortic regurgitation is 

present. There is no aortic valvular stenosis. 



 MITRAL VALVE 

The mitral valve is normal in structure. Mitral regurgitation is 

trace.



 TRICUSPID VALVE 

The tricuspid valve is normal in structure. There is mild tricuspid 

regurgitation.



 PULMONIC VALVE 

The pulmonary valve is normal in structure.



 GREAT VESSELS 

The aortic root is normal in size. The IVC is normal in size and 

collapses >50% with inspiration.



 PERICARDIAL EFFUSION 

There is no pleural effusion. There is no pericardial effusion.



<Conclusion>

Normal chamber size.

Normal LV systolic function.

Mild concentric LVH.

Mild TR.

## 2019-04-01 NOTE — HP
DATE OF EXAM:  03/31/2019



HISTORY OF PRESENT ILLNESS:  This 28-year-old male is admitted to critical

care unit bed #5 on the afternoon of Sunday, 03/31/2019.  This patient

presented to the [facility] ER earlier today with a chief

complaint of chest discomfort secondary to palpitations.  The patient

states he has had a cough for the past several months.  He was being

treated by a physician at the [facility] on [address] in

[place], [place].  He states he he had at least two rounds of oral

antibiotics, but his cough persisted. The cough was disruptive even during his

work hours.  The patient was eating a seafood dinner on the evening of

Saturday when he experienced pounding palpitations and shortness of breath.

He felt it might be related to the volume of food he ate and then when it

did not resolve after a good night's sleep, he presented to [facility] ER on Sunday, 03/31/2019 for further evaluation of the above.



PAST MEDICAL HISTORY:  He states he has had no hospitalizations and no

surgeries in his past.



MEDICATIONS:  He is on no prescription medication.



ALLERGIES:  HE DENIES ANY KNOWN ALLERGIES TO MEDICATION.



FAMILY HISTORY:  Noncontributory except that his grandmother had a open

heart surgery approximately 3 years ago.  All of his siblings are in good

health.



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  He denies any history of smoking or

illicit drug misuse, but states he drinks whiskey every weekend.



REVIEW OF SYSTEMS:

Constitutional:  He denied any fever or chills.

Head:  No headache or seizure.

Eyes:  No change in visual acuity, he does wear glasses.

Ears:  No hearing loss.

Throat:  No swallowing difficulty.

Neck:  No stiffness.

Cardiac:  He denied any knowledge of valvular heart disease, rheumatic

fever or any palpitations in his past or syncope.

Pulmonary:  No cough.  No hemoptysis.

Gastrointestinal:  No hematemesis.  No melena.

Genitourinary:  No dysuria.

Skin:  No rash.

Vascular:  No claudication.

Psychological:  No anxiety.  No depression.

Neurological:  No knowledge of slurred speech or weakness or stroke.



PHYSICAL EXAMINATION

GENERAL:  The patient was noted to be in atrial fibrillation rhythm on the

cardiac monitor and had, had previous episodes of tachycardia and had

already received two rounds of IV Cardizem push.

VITAL SIGNS:  His temperature was 98.2, respirations 18, pulse 82 and blood

pressure 127/95.  His pulse had wavered between 101 and 149 irregular beats

per minute.

HEENT:  Head; normocephalic, atraumatic.  Eyes; no icterus.  Ears; clear. 

Throat; noninjected.

NECK:  Supple.

CARDIOPULMONARY:  Heart; S1, S2.  No pathological rubs, murmurs or gallops.

LUNGS:  Clear.

ABDOMEN:  Soft.

EXTREMITIES:  No edema.

SKIN:  Without rash.

NEUROLOGICAL:  Intact.

PSYCHOLOGICAL:  Alert.

VASCULAR:  Legs warm to touch.



LABORATORY DATA:  White count 11,800, hemoglobin 15.6, hematocrit 47.7,

platelets 275,000.  PT/INR 1.02, PTT 35.7.  Sodium 139, potassium 4.4,

chloride 102, bicarbonate 29, BUN 13, creatinine 1.1, random blood sugar

90, calcium 10.2.  Phosphorous 3.6, magnesium 2.3.  Bilirubin 0.5, AST 40,

ALT 25 and alkaline phosphatase 82.  .  Troponin less than 0.01. 

, T4 7.7, T3 1.34, TSH 0.94, all normal.  Urinalysis was

unremarkable.  Urine drug screen was entirely negative.  Influenza A and B

serologies were negative.  I did review his chest x-ray.  It showed no

active disease, no infiltrate, no pneumothorax, no effusion, normal heart

size.  Chest CT was reviewed.  There was no evidence of a pulmonary

embolism.  An EKG showed atrial fibrillation with nonspecific ST-T wave

changes.



IMPRESSION:  A 28-year-old male with new-onset atrial fibrillation with

rapid ventricular response status post a respiratory illness that has

persisted over the past several months.



PLAN:  Admit this patient to the critical care unit where he will be

started on IV Cardizem drip as well as Lovenox subcutaneous after

discussion with Dr. Mitesh Gonzalez from Cardiology.  The patient will be

monitored regarding his response in the Intensive Care Unit and additional

diagnostic workup and testing will be entertained.  I have requested an

echocardiogram and a heart-healthy diet as well as a repeat EKG and based

on these results, additional workup will be entertained.  All of the above

was reviewed with Sugey Godfrey, Physician Assistant.



Greater than 75 minutes was spent in the care management, review of labs,

orders and x-rays and discussion of this patient with norma Godfrey. 

All questions were answered.







__________________________________________

Jen Sheth MD





DD:  04/01/2019 11:37:25

DT:  04/01/2019 11:43:46

Job # [id number]



ADIN

## 2019-04-01 NOTE — CON
DATE:  03/31/2019



HISTORY OF PRESENT ILLNESS:  The patient is a 28-year-old gentleman without

any past medical history who started to complain on cough about a week ago,

which was treated with antibiotic for it with resolution of cough few days

later.  However, yesterday, he start experiencing palpitations, pounding

heartbeats, and some shortness of breath associated with it.  He was

concerned enough to go to ER next day, which is today, seeking medical

attention.  In the emergency room, he was found to have new onset of

paroxysmal atrial fibrillation.  He received two boluses of Cardizem IV and

will be started on Cardizem drip shortly.  Cardiology consult was requested

from Dr. Gonzalez, it is pending at present time.



PAST MEDICAL HISTORY:  None.



SOCIAL HISTORY:  No alcohol or illicit drug abuse.  No tobacco smoking.



FAMILY HISTORY:  Noncontributory.



MEDICATIONS AT HOME:  None.



ALLERGIES:  NKDA.



REVIEW OF SYSTEMS:  Review of 12-organ system other than mentioned in

history of present illness is negative.



PHYSICAL EXAMINATION:

VITAL SIGNS:  Blood pressure 121/58, respiratory rate 15, and oxygen

saturation 98%.

ENT:  Head and neck atraumatic.

LUNGS:  Clear to auscultation bilaterally.

HEART:  Regular rate and rhythm.  S1 and S2 normal.

ABDOMEN:  Soft, nontender, and nondistended.

MUSCULOSKELETAL:  No C/C/E.

NEUROLOGIC:  The patient moves all extremities spontaneously.

SKIN:  Moist.

PSYCHIATRIC:  The patient is alert, awake, and oriented x3.



LABORATORY DATA:  Chest x-ray, no active pulmonary disease.



CAT scan of the chest repeat protocol showed unremarkable imaging studies

and no pulmonary embolus.



WBC 11.8, hemoglobin 15.6, platelet count 275, and eosinophil count 2.8. 

Sodium 139, potassium 4.4, chloride 102, carbon dioxide 29, BUN 15,

creatinine 1.1, glucose 90, and magnesium 2.3.  AST 40, ALT 25, total

bilirubin 0.5, alkaline phosphatase 82, and .  CPK _____.  Troponin

less than 0.01.  ProBNP 359 (normal), albumin 4.4, TSH 0.94, and INR 1.02. 

Urine is negative for nitrite, blood, ketones, bilirubin, and leukocyte

esterase.  Urine toxicology screen negative for opiates, methadone,

barbiturates, phencyclidine, amphetamines, benzodiazepines, cocaine, or

marijuana.  Influenza A and B are negative.



ASSESSMENT AND PLAN:  This is a 28-year-old gentleman with new onset of

atrial fibrillation of unclear etiology.  Besides ruling out pulmonary

embolism, hyperthyroidism, and stimulants abuse, infectious etiology in 
otherwise healthy indivisual might potentially be considered after bout of URTI.
It would be too early in the season for Lyme's disease though. The patient has 
mild leukocytosis, but he is afebrile and

does not appear to be septic.  Possibility of myocarditis after recent

upper respiratory tract infection unlikely as it would be too early, but cant be
ruled out completely, even though myocarditis would be classically associated 
with ventricular arrhythmias and not atrial ones. Thus echocardiogram

will be ordered.  Cardiology consult is pending.  His CHADS-VASc score is

0-->decision about AC will be defered to cardiologist service.



ccm time 40 min



__________________________________________

Reece Montes MD





DD:  03/31/2019 18:47:15

DT:  03/31/2019 23:42:48

Job # [id number]

ADIN

## 2019-04-01 NOTE — CP.PCM.PN
<Scarlet Fang - Last Filed: 04/01/19 12:08>





Subjective





- Date & Time of Evaluation


Date of Evaluation: 04/01/19


Time of Evaluation: 08:00





- Subjective


Subjective: 


Scarlet Fang DO, PGY-2: ICU Progress Note


Patient was seen and examined at bedside. Patient reports having no chest pain, 

but does experience some palpitations. No adverse events were noted. 








Objective





- Vital Signs/Intake and Output


Vital Signs (last 24 hours): 


                                        











Temp Pulse Resp BP Pulse Ox


 


 98.1 F   80   42 H  115/70   96 


 


 03/31/19 20:18  04/01/19 09:00  04/01/19 08:10  04/01/19 07:56  04/01/19 08:10











- Medications


Medications: 


                               Current Medications





Diltiazem HCl (Cardizem)  60 mg PO Q8H DEBBY


   Last Admin: 04/01/19 09:00 Dose:  60 mg


Enoxaparin Sodium (Lovenox)  120 mg SC Q12H DEBBY; Protocol


   Last Admin: 04/01/19 09:00 Dose:  120 mg











- Labs


Labs: 


                                        





                                 03/31/19 13:45 





                                 03/31/19 13:45 





                                        











PT  11.3 SECONDS (9.4-12.5)   03/31/19  13:45    


 


INR  1.02   03/31/19  13:45    


 


APTT  35.7 Seconds (26.9-38.3)   03/31/19  13:45    














- Constitutional


Appears: Non-toxic, Other (somewhat anxious)





- Head Exam


Head Exam: ATRAUMATIC, NORMOCEPHALIC





- Eye Exam


Eye Exam: EOMI, Normal appearance





- ENT Exam


ENT Exam: Mucous Membranes Moist, Normal Oropharynx





- Neck Exam


Neck Exam: Normal Inspection





- Respiratory Exam


Respiratory Exam: Clear to Ausculation Bilateral, NORMAL BREATHING PATTERN.  

absent: Accessory Muscle Use





- Cardiovascular Exam


Cardiovascular Exam: Tachycardia, +S1, +S2





- GI/Abdominal Exam


GI & Abdominal Exam: Soft, Normal Bowel Sounds





- Extremities Exam


Extremities Exam: Normal Inspection.  absent: Calf Tenderness





- Neurological Exam


Neurological Exam: Alert, Awake, Oriented x3





- Psychiatric Exam


Psychiatric exam: Anxious, Normal Affect





- Skin


Skin Exam: Dry, Intact, Normal Color, Warm





Assessment and Plan





- Assessment and Plan (Free Text)


Assessment: 


28 year old  male with no known past medical history who 

presented to [facility] [place], [place] for dyspnea and palpitations and was found to be in atrial 

fibrillation with RVR. He was started on a Diltiazem drip in the ED and was 

transferred to the ICU for closer monitoring. Cardiology was consulted. 

Currently the patient is rate controlled on PO diltiazem and off the Diltiazem 

drip. 





1) Atrial fibrillation with RVR


- Diltiazem 60 mg q8h DEBBY


- Lovenox 120 mg q12h


- echocardiogram ordered





2) Elevated D-dimer


- CT per PE protocol showed no PE


- Recommend US of LEs to be performed to completely rule out thrombosis





Case was reviewed and discussed with attending physician, Dr. Campuzano














<Roslyn Campuzano - Last Filed: 04/01/19 12:18>





Objective





- Vital Signs/Intake and Output


Vital Signs (last 24 hours): 


                                        











Temp Pulse Resp BP Pulse Ox


 


 98.1 F   80   42 H  115/70   96 


 


 03/31/19 20:18  04/01/19 09:00  04/01/19 08:10  04/01/19 07:56  04/01/19 08:10











- Medications


Medications: 


                               Current Medications





Diltiazem HCl (Cardizem)  60 mg PO Q8H DEBBY


   Last Admin: 04/01/19 09:00 Dose:  60 mg


Enoxaparin Sodium (Lovenox)  120 mg SC Q12H DEBBY; Protocol


   Last Admin: 04/01/19 09:00 Dose:  120 mg











- Labs


Labs: 


                                        





                                 03/31/19 13:45 





                                 03/31/19 13:45 





                                        











PT  11.3 SECONDS (9.4-12.5)   03/31/19  13:45    


 


INR  1.02   03/31/19  13:45    


 


APTT  35.7 Seconds (26.9-38.3)   03/31/19  13:45    














Addendum


Addendum: 





04/01/19 12:18


MICU Attending Addendum:


Patient seen and examined with housestaff, case discussed on rounds. I agree 

with resident note above with the following additions/exceptions:





28M with new onset afib, otherwise healthy


Transition off cardizem drip to PO as per cardio


f/u cardio recs


CHADVASC is 0 so would not given long term anticoagulation


if hr remains controlled with transfer to telemetry today


Rest of care as per resident note above


Roslyn Campuzano MD


Attending


Pulmonary Critical Care Sleep Medicine

## 2019-04-02 VITALS — OXYGEN SATURATION: 98 %

## 2019-04-02 PROCEDURE — 5A2204Z RESTORATION OF CARDIAC RHYTHM, SINGLE: ICD-10-PCS | Performed by: INTERNAL MEDICINE

## 2019-04-02 PROCEDURE — B246ZZ4 ULTRASONOGRAPHY OF RIGHT AND LEFT HEART, TRANSESOPHAGEAL: ICD-10-PCS | Performed by: INTERNAL MEDICINE

## 2019-04-02 RX ADMIN — DILTIAZEM HYDROCHLORIDE SCH MG: 120 CAPSULE, COATED, EXTENDED RELEASE ORAL at 12:03

## 2019-04-02 NOTE — CARD
--------------- APPROVED REPORT --------------





Date of service: 04/02/2019



EXAM: Two-dimensional and M-mode echocardiogram with Doppler and 

color Doppler.



INDICATION

Atrial Fibrillation



Reason For Test : Rule out Intracardiac Thrombus.



PROCEDURE

After obtaining informed consent, patient underwent transesophageal 

echo in the ICU.

Type of Sedation : Conscious Sedation

Sedation was achieved with Versed, Fentanyl  intravenously. 

Transesophageal probe was inserted and advanced into esophagus 

without difficulty.

The DANITZA was performed without complications. 

Synchronized Cardioversion acheived with 100 Joules after 1 

attempt(s). 

Rhythm following Synchronized Cardioversion: Normal Sinus Rhythm 

Throughout the procedure, the blood pressure, pulse oximetry, cardiac 

rhythm, and rate were monitored.

The patient tolerated the procedure without adverse effects. Recovery 

from conscious sedation was uneventful and vital signs were stable.



 LEFT VENTRICLE 

The left ventricle is normal size. There is normal left ventricular 

wall thickness. The left ventricular function is normal. The left 

ventricular ejection fraction is within the normal range. There is 

normal LV segmental wall motion.



 RIGHT VENTRICLE 

The right ventricle is normal size. The right ventricular systolic 

function is normal.



 ATRIA 

The left atrium size is normal. No thrombus was seen in the left 

atrial appendage. The right atrium size is normal. The interatrial 

septum is intact with no evidence for an atrial septal defect.



 AORTIC VALVE 

The aortic valve is normal in structure. No aortic regurgitation is 

present. There is no aortic valvular stenosis. 



 MITRAL VALVE 

The mitral valve is normal in structure. There is no mitral valve 

stenosis. There is no mitral valve regurgitation noted.



 TRICUSPID VALVE 

The tricuspid valve is normal in structure. There is mild tricuspid 

regurgitation.



 PULMONIC VALVE 

The pulmonary valve is normal in structure.



 GREAT VESSELS 

The aortic root is normal in size. The ascending and descending 

thoracic aorta is normal in size. The IVC is normal in size and 

collapses >50% with inspiration.



<Conclusion>

Normal transesophageal echocardiogram.

Successful cardioversion to NSR.

## 2019-04-02 NOTE — PN
DATE:  04/02/2019



SUBJECTIVE:  The patient is seen lying in bed in the CCU.  He remains in

atrial fibrillation with controlled rate.  His echocardiogram reviewed

yesterday reveals normal chamber size with borderline concentric LVH,

normal LV systolic function, mild tricuspid regurgitation.



CURRENT MEDICATIONS:  Include Cardizem 60 mg every 8 hours as well as

Lovenox 120 mg twice daily.



PHYSICAL EXAMINATION:

GENERAL:  He is a muscular young man.

VITAL SIGNS:  His blood pressure is 124/50 with pulse of 70, in atrial

fibrillation, respirations are 16.  He is afebrile.

HEENT:  No JVD.

CHEST:  Clear to auscultation and percussion.

HEART:  PMI in normal position.  No pathologic murmurs or gallops noted.

ABDOMEN:  Soft, nontender, normoactive bowel sounds.

EXTREMITIES:  No edema.



DIAGNOSTIC DATA:  Morning blood work pending.



IMPRESSION:  Atrial fibrillation appears to be recent onset, either known

atrial fibrillation or possibly due to recent excess alcohol intake.



RECOMMENDATIONS:  His oral Cardizem will be continued for now.  Plans will

be made for transesophageal electrocardiogram with possible cardioversion

this morning.  Risks and benefits have been discussed in detail with him. 

Assuming this is successful, I would recommend oral anticoagulation for

several weeks following discharge with outpatient followup.  If his atrial

fibrillation does not recur, this can then be discontinued.  Avoidance of

excessive alcohol use is recommended.







__________________________________________

Mitesh Gonzalez MD





DD:  04/02/2019 7:40:11

DT:  04/02/2019 10:24:11

Job # [id number]

## 2019-04-02 NOTE — CARD
--------------- APPROVED REPORT --------------





Date of service: 04/02/2019



EKG Measurement

Heart [id number]

KY [id number]

[id number]

[id number]

[id number]



<Conclusion>

Normal sinus rhythm

Minimal voltage criteria for LVH, may be normal variant

Borderline ECG

## 2019-04-02 NOTE — PN
DATE:  04/02/2019



SUBJECTIVE:  This 28-year-old male was examined in the CCU bed #5 on the

morning of Tuesday, 04/02/2019.  He remains in atrial fibrillation on the

cardiac monitor.  He still has episodes of rapid ventricular response and

is on schedule today for a transesophageal echocardiogram for which he

remains n.p.o.  He denies any fever, chills, chest pain or shortness of

breath and is receiving subcu Lovenox for anticoagulation prophylaxis.



PHYSICAL EXAMINATION:

VITAL SIGNS:  Temperature was 98.1, respirations 19, pulse 62 to 133 and

blood pressure 124/54 with a pulse ox of 97% room air.

HEENT:  Head is normocephalic, atraumatic.  Eyes; no icterus.  Ears; clear.

Throat; non-injected.

NECK:  Supple.

HEART:  Irregular S1, S2.  No pathological rubs, murmurs or gallops.

LUNGS:  Clear.

ABDOMEN:  Soft.

EXTREMITIES:  No edema.

SKIN:  Without rash.

NEUROLOGIC:  Intact.

PSYCHOLOGIC:  Alert and oriented x3.

VASCULAR:  Legs warm to touch.



LABORATORY DATA:  White count 11,800, hemoglobin 15.6, hematocrit 47.7,

platelets 275,000.  PT/INR 1.02, PTT 35.7.  Sodium 139, K 4.4, chloride

102, bicarb 29, BUN 13, creatinine 1.1, random blood sugar 90, calcium

10.2.  Phosphorous 3.6, magnesium 2.3.  Bilirubin 0.5, AST 40, ALT 25, alk

phos 82.  Troponin less than 0.01.  .  TSH 0.94, T3 1.34, total T3

1.34 and T4 7.7.  Urinalysis was unremarkable.  Urine drug screen was

negative and influenza A and B serologies were negative.



IMPRESSION:  This is a 28-year-old male with new-onset atrial fibrillation

with intermittent rapid ventricular response and also with recent

viral-like illness and clinical obesity.



PLAN:  At present is to continue Cardizem 60 mg p.o. every 8 hours, Lovenox

120 mg subcu every 12 hours.  The patient is on schedule for a

transesophageal echocardiography and cardioversion will be considered based

on his clinical progress and echocardiographic results.  The patient was

counseled regarding cessation of alcohol ingestion and weight reduction

diet.



Greater than 60 minutes were spent in the critical care management of this

patient today in discussion with his family and nursing.  All questions

were answered.







__________________________________________

Jen Sheth MD





DD:  04/02/2019 8:09:54

DT:  04/02/2019 9:52:23

Job # [id number]

## 2019-04-03 VITALS — HEART RATE: 74 BPM | DIASTOLIC BLOOD PRESSURE: 76 MMHG | TEMPERATURE: 97.9 F | SYSTOLIC BLOOD PRESSURE: 133 MMHG

## 2019-04-03 VITALS — RESPIRATION RATE: 20 BRPM

## 2019-04-03 RX ADMIN — DILTIAZEM HYDROCHLORIDE SCH MG: 120 CAPSULE, COATED, EXTENDED RELEASE ORAL at 09:30

## 2019-04-03 NOTE — CARD
--------------- APPROVED REPORT --------------





Date of service: 04/03/2019



EKG Measurement

Heart [id number]

DC [id number]

[id number]

[id number]

[id number]



<Conclusion>

Normal sinus rhythm

ST elevation, consider early repolarization- a normal variant

Borderline ECG

## 2019-04-03 NOTE — PN
DATE:  04/03/2019



SUBJECTIVE:  The patient is seen lying in bed on telemetry.  He is

comfortable at the present time.  He remains in sinus rhythm.



CURRENT MEDICATIONS:  Include Eliquis 5 mg twice a day and diltiazem 

mg daily.



OBJECTIVE:

GENERAL:  He is a muscular young man.

VITAL SIGNS:  His blood pressure is 118/76, the pulse is 76 and sinus

respiration is 14.  He is afebrile.

HEENT:  No JVD or bruits noted.

CHEST:  Clear to auscultation and percussion.

HEART:  Normal.  No pathological gallops noted.

ABDOMEN:  Soft and nontender with normoactive bowel sounds.

EXTREMITIES:  No edema.



DIAGNOSTIC DATA:  Electrocardiogram reveals sinus rhythm with J point

elevation, normal variant for age.



IMPRESSION:  Recently on atrial fibrillation, now sinus rhythm following

electrocardioversion yesterday.  Atrial fibrillation either due to recent

heavier alcohol intake or known atrial fibrillation.



RECOMMENDATIONS:  His current medications will continue for now.  If

remains in sinus rhythm, eventual discontinuation of his Eliquis and

diltiazem will be planned in 4-6 weeks, outpatient followup has been

arranged.  Avoidance of excessive alcohol intake was encouraged.  Regular

exercise and healthy diet was encouraged as well.







__________________________________________

Mitesh Gonzalez MD





DD:  04/03/2019 8:29:14

DT:  04/03/2019 9:53:22

Job # [id number]

MTDD

## 2019-04-04 NOTE — DS
FINAL DIAGNOSIS:  Atrial fibrillation, resolved.



DISPOSITION:  Home.



FOLLOWUP:  With Dr. Mitesh Gonzalez, Cardiology in his office in 1 week as

requested.



PROCEDURE PERFORMED:  Transesophageal echo with cardioversion.



DISCHARGE MEDICATIONS:  Cardizem  mg p.o. daily, Eliquis 5 mg p.o.

b.i.d., the patient was given 2 weeks supply of each and no refills.



HOSPITAL COURSE:  This 28-year-old male successfully completed a

transesophageal echocardiogram which was reviewed that showed normal

chamber size, normal LV systolic function, mild concentric left ventricular

hypertrophy with mild tricuspid regurgitation and evaluation of his left

atrium showed normal size, right atrium normal size and intra-atrial septum

intact with no evidence of an intra-atrial septal defect.  The patient was

not noted to have any evidence of intra cardiac thrombus.  He has one

successful cardioversion and at the day of discharge, temperature was 98,

respirations 20, pulse 77 and blood pressure 118/76 with a pulse ox of 98%

on room air.  Cardiac monitor showed normal sinus rhythm.  Discharge labs;

white count 11,800, hemoglobin 15.6, hematocrit 47.7, and platelets

275,000.  PT/INR 1.02, PTT 35.7.  Sodium 139, K of 4.4, chloride 102,

bicarb 29, BUN 13, creatinine 1.1, and random blood sugar 90.  All liver

function testing was normal including bilirubin 0.5, AST 40, ALT 25, and

alk phos 82.  All thyroid functions were normal including T4 of 7.7, T3 of

1.34, and TSH 0.94.  Urinalysis was unremarkable.  Urine drug screen was

unremarkable and influenza serology A and B were negative.  The patient was

cleared for discharge to home by Dr. Mitesh Gonzalez from Cardiology both

chest x-ray and chest CT was unremarkable.  There was no evidence of a

pulmonary embolism and no pneumonic infiltrate.  The patient was

interviewed at his bedside at the time of discharge with his mother, Carmella

present as well as nurse Alysha Sandoval, registered nurse.  The patient's

prescriptions were reviewed.  All questions were answered and both were

aware of the need to follow up with Dr. Mitesh Gonzalez to avoid caffeinated

products to discontinue alcohol ingestion, weight reduction, exercise

program and heart-healthy diet.



Greater than 35 minutes was spent in the discharge management of this

patient today in the presence of his family.  All questions were answered.







__________________________________________

Jen Sheth MD





DD:  04/03/2019 11:32:14

DT:  04/03/2019 11:34:21

Job # [id number]